# Patient Record
Sex: FEMALE | Race: BLACK OR AFRICAN AMERICAN
[De-identification: names, ages, dates, MRNs, and addresses within clinical notes are randomized per-mention and may not be internally consistent; named-entity substitution may affect disease eponyms.]

---

## 2018-05-22 ENCOUNTER — HOSPITAL ENCOUNTER (EMERGENCY)
Dept: HOSPITAL 57 - BURERS | Age: 28
Discharge: HOME | End: 2018-05-22
Payer: SELF-PAY

## 2018-05-22 DIAGNOSIS — F17.210: ICD-10-CM
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
## 2018-05-22 DIAGNOSIS — S92.352A: Primary | ICD-10-CM

## 2018-05-22 PROCEDURE — 29515 APPLICATION SHORT LEG SPLINT: CPT

## 2018-05-22 NOTE — RAD
LEFT FOOT THREE VIEWS:

 

05/22/2018

 

FINDINGS:

There is a fracture at the base of the fifth metatarsal that is minimally displaced.  The remainder o
f the foot apparent intact.  The joints appear normal.

 

IMPRESSION:

Fracture at the base of the fifth metatarsal.

 

POS: HOME

## 2020-03-30 ENCOUNTER — HOSPITAL ENCOUNTER (EMERGENCY)
Dept: HOSPITAL 57 - BURERS | Age: 30
Discharge: HOME | End: 2020-03-30
Payer: COMMERCIAL

## 2020-03-30 DIAGNOSIS — F17.210: ICD-10-CM

## 2020-03-30 DIAGNOSIS — J39.9: Primary | ICD-10-CM

## 2020-03-30 DIAGNOSIS — B97.89: ICD-10-CM

## 2020-03-30 PROCEDURE — 99283 EMERGENCY DEPT VISIT LOW MDM: CPT
